# Patient Record
Sex: FEMALE | Race: WHITE | NOT HISPANIC OR LATINO
[De-identification: names, ages, dates, MRNs, and addresses within clinical notes are randomized per-mention and may not be internally consistent; named-entity substitution may affect disease eponyms.]

---

## 2022-05-18 ENCOUNTER — APPOINTMENT (OUTPATIENT)
Dept: OTOLARYNGOLOGY | Facility: CLINIC | Age: 57
End: 2022-05-18
Payer: COMMERCIAL

## 2022-05-18 VITALS
HEART RATE: 84 BPM | WEIGHT: 137 LBS | SYSTOLIC BLOOD PRESSURE: 136 MMHG | DIASTOLIC BLOOD PRESSURE: 75 MMHG | TEMPERATURE: 98.5 F | BODY MASS INDEX: 25.21 KG/M2 | HEIGHT: 62 IN

## 2022-05-18 DIAGNOSIS — J34.89 OTHER SPECIFIED DISORDERS OF NOSE AND NASAL SINUSES: ICD-10-CM

## 2022-05-18 DIAGNOSIS — Z78.9 OTHER SPECIFIED HEALTH STATUS: ICD-10-CM

## 2022-05-18 DIAGNOSIS — R09.81 NASAL CONGESTION: ICD-10-CM

## 2022-05-18 PROBLEM — Z00.00 ENCOUNTER FOR PREVENTIVE HEALTH EXAMINATION: Status: ACTIVE | Noted: 2022-05-18

## 2022-05-18 PROCEDURE — 13151 CMPLX RPR E/N/E/L 1.1-2.5 CM: CPT

## 2022-05-18 PROCEDURE — 99203 OFFICE O/P NEW LOW 30 MIN: CPT | Mod: 25

## 2022-05-18 RX ORDER — FEXOFENADINE HCL 60 MG
TABLET ORAL
Refills: 0 | Status: ACTIVE | COMMUNITY

## 2022-05-18 NOTE — ASSESSMENT
[FreeTextEntry1] : 56 year old female with upper lip laceration s/p closure today. Will return in 2 weeks for check. \par \par Also with nasal injury, no nasal fracture noted today.

## 2022-05-18 NOTE — HISTORY OF PRESENT ILLNESS
[de-identified] : 56 year old female referred by urgent care for possible broken nose. States went to  paper in front of the urgent care and fell face first on the cement bled nose for a minute. States right nostril feels very congested, can breathe from the left nostril, facial pressure and pain under her right eye and right upper lip area, feels moisture from abrasion on top of her lip, feels her nose is running but nothing is coming out, attempted to rinse mouth out with water and had a lot of pain. Denies anterior rhinorrhea, loss of consciousness, vomiting, or post nasal drip. States has history of seasonal allergies takes Allegra daily. Allergic to Penicillin. \par She also has upper lip laceration, inner lip.

## 2022-05-18 NOTE — REASON FOR VISIT
[Initial Consultation] : an initial consultation for [FreeTextEntry2] : referred by urgent care for possible broken nose , upper lip laceration

## 2022-05-18 NOTE — PROCEDURE
[FreeTextEntry1] : Upper lip laceration repair [FreeTextEntry2] : Upper lip laceration [FreeTextEntry3] : After informed consent, the mucosal upper lip was injected with 1% lidocaine with epinephrine. A 5-0 monocryl suture was placed for deep closure. For mucosal edges, a 4-0 chromic suture was placed. patient tolerated well.

## 2022-06-01 ENCOUNTER — APPOINTMENT (OUTPATIENT)
Dept: OTOLARYNGOLOGY | Facility: CLINIC | Age: 57
End: 2022-06-01